# Patient Record
Sex: MALE | Race: WHITE | URBAN - METROPOLITAN AREA
[De-identification: names, ages, dates, MRNs, and addresses within clinical notes are randomized per-mention and may not be internally consistent; named-entity substitution may affect disease eponyms.]

---

## 2023-07-12 ENCOUNTER — OFFICE VISIT (OUTPATIENT)
Dept: URBAN - METROPOLITAN AREA CLINIC 10 | Facility: CLINIC | Age: 43
End: 2023-07-12
Payer: COMMERCIAL

## 2023-07-12 DIAGNOSIS — H18.601 KERATOCONUS, UNSPECIFIED, RIGHT EYE: Primary | ICD-10-CM

## 2023-07-12 DIAGNOSIS — H18.602 KERATOCONUS, UNSPECIFIED, LEFT EYE: ICD-10-CM

## 2023-07-12 PROCEDURE — 76514 ECHO EXAM OF EYE THICKNESS: CPT | Performed by: OPHTHALMOLOGY

## 2023-07-12 PROCEDURE — 92025 CPTRIZED CORNEAL TOPOGRAPHY: CPT | Performed by: OPHTHALMOLOGY

## 2023-07-12 PROCEDURE — 92004 COMPRE OPH EXAM NEW PT 1/>: CPT | Performed by: OPHTHALMOLOGY

## 2023-07-12 ASSESSMENT — INTRAOCULAR PRESSURE
OD: 17
OS: 17

## 2023-07-12 ASSESSMENT — VISUAL ACUITY
OD: 20/50
OS: 20/250

## 2023-07-12 ASSESSMENT — KERATOMETRY: OD: 45.13

## 2023-07-12 NOTE — IMPRESSION/PLAN
Impression: Keratoconus, unspecified, right eye: H18.601. H/o CXL OU x 2 by [employer] in [place], suspect epi-on, will get notes Plan: Explained to the patient the progressive nature of the disease and can significantly reduce a patient's visual acuity and visual quality. First line treatments include hard contact lenses, scleral contact lenses and intracorneal ring segments. These treatments address signs and symptoms but do not halt progression of the disease. If the disease progresses, it can lead to corneal blindness and may require corneal transplantation for visual recovery which is invasive, has a prolonged recovery period, and carries a lifelong risk of tissue rejection, graft failure, post-surgical medications and other complications. Corneal crosslinking (CXL) is the only FDA-approved treatment modality that has been shown to slow or halt the progression of keratoconus. This may prevent the need for future corneal transplantation. Early treatment of keratoconus with CXL is medically necessary and indicated. Understands that their insurance does not cover the procedure, refer to pt assistance program
*** See [facility] for specialty CTLs, medically necessary for functional vision.

## 2023-07-12 NOTE — IMPRESSION/PLAN
Impression: Keratoconus, unspecified, left eye: H18.602. Plan: H/o CXL OU x 2 by [employer] in [place], suspect epi off, will get notes. Also s/p INTAC segment OS. Explained to pt that will send to [facility] for specialty CTL fitting OS, if unable to get good fit or vision is unacceptable, may send back to cornea clinic for PKP OS

## 2024-01-08 ENCOUNTER — OFFICE VISIT (OUTPATIENT)
Dept: URBAN - METROPOLITAN AREA CLINIC 44 | Facility: CLINIC | Age: 44
End: 2024-01-08
Payer: COMMERCIAL

## 2024-01-08 DIAGNOSIS — H18.602 KERATOCONUS, UNSPECIFIED, LEFT EYE: Primary | ICD-10-CM

## 2024-01-08 DIAGNOSIS — H18.601 KERATOCONUS, UNSPECIFIED, RIGHT EYE: ICD-10-CM

## 2024-01-08 PROCEDURE — 76514 ECHO EXAM OF EYE THICKNESS: CPT | Performed by: OPHTHALMOLOGY

## 2024-01-08 PROCEDURE — 92025 CPTRIZED CORNEAL TOPOGRAPHY: CPT | Performed by: OPHTHALMOLOGY

## 2024-01-08 PROCEDURE — 92012 INTRM OPH EXAM EST PATIENT: CPT | Performed by: OPHTHALMOLOGY

## 2024-01-08 RX ORDER — PREDNISOLONE ACETATE 10 MG/ML
1 % SUSPENSION/ DROPS OPHTHALMIC
Qty: 5 | Refills: 3 | Status: INACTIVE
Start: 2024-01-08 | End: 2024-02-14

## 2024-01-08 RX ORDER — OFLOXACIN 3 MG/ML
0.3 % SOLUTION/ DROPS OPHTHALMIC
Qty: 5 | Refills: 1 | Status: ACTIVE
Start: 2024-01-08

## 2024-01-08 ASSESSMENT — INTRAOCULAR PRESSURE
OS: 22
OD: 22

## 2024-01-08 ASSESSMENT — VISUAL ACUITY
OS: 20/150
OD: 20/70

## 2024-01-08 ASSESSMENT — KERATOMETRY: OD: 48.13

## 2024-01-23 ENCOUNTER — ADULT PHYSICAL (OUTPATIENT)
Dept: URBAN - METROPOLITAN AREA CLINIC 44 | Facility: CLINIC | Age: 44
End: 2024-01-23
Payer: COMMERCIAL

## 2024-01-23 DIAGNOSIS — Z01.818 ENCOUNTER FOR OTHER PREPROCEDURAL EXAMINATION: Primary | ICD-10-CM

## 2024-01-23 PROCEDURE — 99203 OFFICE O/P NEW LOW 30 MIN: CPT | Performed by: PHYSICIAN ASSISTANT

## 2024-02-06 ENCOUNTER — SURGERY (OUTPATIENT)
Dept: URBAN - METROPOLITAN AREA SURGERY 5 | Facility: SURGERY | Age: 44
End: 2024-02-06
Payer: COMMERCIAL

## 2024-02-06 PROCEDURE — 65730 CORNEAL TRANSPLANT: CPT | Performed by: OPHTHALMOLOGY

## 2024-02-07 ENCOUNTER — POST-OPERATIVE VISIT (OUTPATIENT)
Dept: URBAN - METROPOLITAN AREA CLINIC 44 | Facility: CLINIC | Age: 44
End: 2024-02-07
Payer: COMMERCIAL

## 2024-02-07 DIAGNOSIS — Z48.810 ENCOUNTER FOR SURGICAL AFTERCARE FOLLOWING SURGERY ON A SENSE ORGAN: Primary | ICD-10-CM

## 2024-02-07 PROCEDURE — 99024 POSTOP FOLLOW-UP VISIT: CPT | Performed by: OPTOMETRIST

## 2024-02-07 ASSESSMENT — INTRAOCULAR PRESSURE: OS: 24

## 2024-02-13 ENCOUNTER — POST-OPERATIVE VISIT (OUTPATIENT)
Dept: URBAN - METROPOLITAN AREA CLINIC 44 | Facility: CLINIC | Age: 44
End: 2024-02-13
Payer: COMMERCIAL

## 2024-02-13 DIAGNOSIS — Z94.7 CORNEAL TRANSPLANT STATUS: Primary | ICD-10-CM

## 2024-02-13 PROCEDURE — 99024 POSTOP FOLLOW-UP VISIT: CPT | Performed by: OPHTHALMOLOGY

## 2024-02-13 ASSESSMENT — INTRAOCULAR PRESSURE
OD: 22
OS: 18

## 2024-02-27 ENCOUNTER — POST-OPERATIVE VISIT (OUTPATIENT)
Dept: URBAN - METROPOLITAN AREA CLINIC 44 | Facility: CLINIC | Age: 44
End: 2024-02-27
Payer: COMMERCIAL

## 2024-02-27 DIAGNOSIS — Z48.810 ENCOUNTER FOR SURGICAL AFTERCARE FOLLOWING SURGERY ON A SENSE ORGAN: ICD-10-CM

## 2024-02-27 DIAGNOSIS — Z94.7 CORNEAL TRANSPLANT STATUS: Primary | ICD-10-CM

## 2024-02-27 PROCEDURE — 99024 POSTOP FOLLOW-UP VISIT: CPT | Performed by: OPHTHALMOLOGY

## 2024-02-27 RX ORDER — DIFLUPREDNATE OPHTHALMIC 0.5 MG/ML
0.05 % EMULSION OPHTHALMIC
Qty: 5 | Refills: 6 | Status: ACTIVE
Start: 2024-02-27

## 2024-02-27 ASSESSMENT — INTRAOCULAR PRESSURE
OD: 19
OS: 26

## 2024-03-04 ENCOUNTER — OFFICE VISIT (OUTPATIENT)
Dept: URBAN - METROPOLITAN AREA CLINIC 44 | Facility: CLINIC | Age: 44
End: 2024-03-04
Payer: COMMERCIAL

## 2024-03-04 DIAGNOSIS — Z94.7 CORNEAL TRANSPLANT STATUS: Primary | ICD-10-CM

## 2024-03-04 DIAGNOSIS — H18.601 KERATOCONUS, UNSPECIFIED, RIGHT EYE: ICD-10-CM

## 2024-03-04 PROCEDURE — 99024 POSTOP FOLLOW-UP VISIT: CPT | Performed by: OPHTHALMOLOGY

## 2024-03-04 RX ORDER — PREDNISOLONE ACETATE 10 MG/ML
1 % SUSPENSION/ DROPS OPHTHALMIC
Qty: 10 | Refills: 3 | Status: ACTIVE
Start: 2024-03-04

## 2024-03-04 RX ORDER — DIFLUPREDNATE 0.5 MG/ML
0.05 % EMULSION OPHTHALMIC
Qty: 10 | Refills: 5 | Status: ACTIVE
Start: 2024-03-04

## 2024-03-04 ASSESSMENT — INTRAOCULAR PRESSURE
OS: 20
OD: 26
OD: 23

## 2024-03-18 ENCOUNTER — POST-OPERATIVE VISIT (OUTPATIENT)
Dept: URBAN - METROPOLITAN AREA CLINIC 44 | Facility: CLINIC | Age: 44
End: 2024-03-18
Payer: COMMERCIAL

## 2024-03-18 DIAGNOSIS — Z94.7 CORNEAL TRANSPLANT STATUS: Primary | ICD-10-CM

## 2024-03-18 PROCEDURE — 99024 POSTOP FOLLOW-UP VISIT: CPT | Performed by: OPHTHALMOLOGY

## 2024-03-18 ASSESSMENT — INTRAOCULAR PRESSURE
OS: 16
OD: 16

## 2024-04-29 ENCOUNTER — OFFICE VISIT (OUTPATIENT)
Dept: URBAN - METROPOLITAN AREA CLINIC 44 | Facility: CLINIC | Age: 44
End: 2024-04-29
Payer: COMMERCIAL

## 2024-04-29 DIAGNOSIS — H18.601 KERATOCONUS, UNSPECIFIED, RIGHT EYE: ICD-10-CM

## 2024-04-29 DIAGNOSIS — Z94.7 CORNEAL TRANSPLANT STATUS: Primary | ICD-10-CM

## 2024-04-29 PROCEDURE — 99024 POSTOP FOLLOW-UP VISIT: CPT | Performed by: OPHTHALMOLOGY

## 2024-04-29 RX ORDER — DIFLUPREDNATE OPHTHALMIC 0.5 MG/ML
0.05 % EMULSION OPHTHALMIC
Qty: 5 | Refills: 11 | Status: ACTIVE
Start: 2024-04-29

## 2024-04-29 ASSESSMENT — INTRAOCULAR PRESSURE
OS: 14
OD: 13

## 2024-06-10 ENCOUNTER — OFFICE VISIT (OUTPATIENT)
Dept: URBAN - METROPOLITAN AREA CLINIC 44 | Facility: CLINIC | Age: 44
End: 2024-06-10
Payer: COMMERCIAL

## 2024-06-10 DIAGNOSIS — Z94.7 CORNEAL TRANSPLANT STATUS: Primary | ICD-10-CM

## 2024-06-10 DIAGNOSIS — H18.601 KERATOCONUS, UNSPECIFIED, RIGHT EYE: ICD-10-CM

## 2024-06-10 PROCEDURE — 92012 INTRM OPH EXAM EST PATIENT: CPT | Performed by: OPHTHALMOLOGY

## 2024-06-10 ASSESSMENT — INTRAOCULAR PRESSURE
OS: 14
OD: 10

## 2024-09-16 ENCOUNTER — OFFICE VISIT (OUTPATIENT)
Dept: URBAN - METROPOLITAN AREA CLINIC 44 | Facility: CLINIC | Age: 44
End: 2024-09-16
Payer: COMMERCIAL

## 2024-09-16 DIAGNOSIS — H18.601 KERATOCONUS, UNSPECIFIED, RIGHT EYE: ICD-10-CM

## 2024-09-16 DIAGNOSIS — Z94.7 CORNEAL TRANSPLANT STATUS: Primary | ICD-10-CM

## 2024-09-16 PROCEDURE — 99213 OFFICE O/P EST LOW 20 MIN: CPT | Performed by: OPHTHALMOLOGY

## 2024-09-16 PROCEDURE — 92025 CPTRIZED CORNEAL TOPOGRAPHY: CPT | Performed by: OPHTHALMOLOGY

## 2024-09-16 RX ORDER — OFLOXACIN 3 MG/ML
0.3 % SOLUTION/ DROPS OPHTHALMIC
Qty: 5 | Refills: 1 | Status: ACTIVE
Start: 2024-09-16

## 2024-09-16 ASSESSMENT — KERATOMETRY: OD: 47.13

## 2024-09-16 ASSESSMENT — VISUAL ACUITY
OS: 20/100
OD: 20/70

## 2024-09-16 ASSESSMENT — INTRAOCULAR PRESSURE
OS: 20
OD: 15

## 2024-11-11 ENCOUNTER — OFFICE VISIT (OUTPATIENT)
Dept: URBAN - METROPOLITAN AREA CLINIC 44 | Facility: CLINIC | Age: 44
End: 2024-11-11
Payer: COMMERCIAL

## 2024-11-11 DIAGNOSIS — H18.601 KERATOCONUS, UNSPECIFIED, RIGHT EYE: ICD-10-CM

## 2024-11-11 DIAGNOSIS — Z94.7 CORNEAL TRANSPLANT STATUS: Primary | ICD-10-CM

## 2024-11-11 PROCEDURE — 92025 CPTRIZED CORNEAL TOPOGRAPHY: CPT | Performed by: OPHTHALMOLOGY

## 2024-11-11 PROCEDURE — 92012 INTRM OPH EXAM EST PATIENT: CPT | Performed by: OPHTHALMOLOGY

## 2024-11-11 ASSESSMENT — INTRAOCULAR PRESSURE
OS: 18
OD: 16

## 2024-11-11 ASSESSMENT — VISUAL ACUITY
OD: 20/60
OS: 20/125

## 2024-12-09 ENCOUNTER — OFFICE VISIT (OUTPATIENT)
Dept: URBAN - METROPOLITAN AREA CLINIC 44 | Facility: CLINIC | Age: 44
End: 2024-12-09
Payer: COMMERCIAL

## 2024-12-09 DIAGNOSIS — H18.601 KERATOCONUS, UNSPECIFIED, RIGHT EYE: ICD-10-CM

## 2024-12-09 DIAGNOSIS — Z94.7 CORNEAL TRANSPLANT STATUS: Primary | ICD-10-CM

## 2024-12-09 PROCEDURE — 92025 CPTRIZED CORNEAL TOPOGRAPHY: CPT | Performed by: OPHTHALMOLOGY

## 2024-12-09 PROCEDURE — 92012 INTRM OPH EXAM EST PATIENT: CPT | Performed by: OPHTHALMOLOGY

## 2024-12-09 ASSESSMENT — INTRAOCULAR PRESSURE
OS: 17
OD: 23

## 2024-12-09 ASSESSMENT — VISUAL ACUITY
OD: 20/50
OS: 20/50

## 2025-01-06 ENCOUNTER — OFFICE VISIT (OUTPATIENT)
Dept: URBAN - METROPOLITAN AREA CLINIC 44 | Facility: CLINIC | Age: 45
End: 2025-01-06
Payer: COMMERCIAL

## 2025-01-06 DIAGNOSIS — H18.601 KERATOCONUS, UNSPECIFIED, RIGHT EYE: ICD-10-CM

## 2025-01-06 DIAGNOSIS — Z94.7 CORNEAL TRANSPLANT STATUS: Primary | ICD-10-CM

## 2025-01-06 PROCEDURE — 92012 INTRM OPH EXAM EST PATIENT: CPT | Performed by: OPHTHALMOLOGY

## 2025-01-06 PROCEDURE — 92025 CPTRIZED CORNEAL TOPOGRAPHY: CPT | Performed by: OPHTHALMOLOGY

## 2025-01-06 ASSESSMENT — VISUAL ACUITY
OD: 20/60
OS: 20/70

## 2025-01-06 ASSESSMENT — INTRAOCULAR PRESSURE
OD: 18
OS: 15

## 2025-01-06 ASSESSMENT — KERATOMETRY: OD: 49.63

## 2025-02-17 ENCOUNTER — OFFICE VISIT (OUTPATIENT)
Dept: URBAN - METROPOLITAN AREA CLINIC 44 | Facility: CLINIC | Age: 45
End: 2025-02-17
Payer: COMMERCIAL

## 2025-02-17 DIAGNOSIS — H18.601 KERATOCONUS, UNSPECIFIED, RIGHT EYE: ICD-10-CM

## 2025-02-17 DIAGNOSIS — Z94.7 CORNEAL TRANSPLANT STATUS: Primary | ICD-10-CM

## 2025-02-17 PROCEDURE — 92025 CPTRIZED CORNEAL TOPOGRAPHY: CPT | Performed by: OPHTHALMOLOGY

## 2025-02-17 PROCEDURE — 92012 INTRM OPH EXAM EST PATIENT: CPT | Performed by: OPHTHALMOLOGY

## 2025-02-17 ASSESSMENT — INTRAOCULAR PRESSURE
OS: 18
OD: 15

## 2025-02-17 ASSESSMENT — VISUAL ACUITY: OS: 20/40

## 2025-03-19 ENCOUNTER — OFFICE VISIT (OUTPATIENT)
Dept: URBAN - METROPOLITAN AREA CLINIC 10 | Facility: CLINIC | Age: 45
End: 2025-03-19
Payer: COMMERCIAL

## 2025-03-19 DIAGNOSIS — H18.601 KERATOCONUS, UNSPECIFIED, RIGHT EYE: ICD-10-CM

## 2025-03-19 DIAGNOSIS — Z94.7 CORNEAL TRANSPLANT STATUS: Primary | ICD-10-CM

## 2025-03-19 PROCEDURE — 92025 CPTRIZED CORNEAL TOPOGRAPHY: CPT | Performed by: OPHTHALMOLOGY

## 2025-03-19 PROCEDURE — 92012 INTRM OPH EXAM EST PATIENT: CPT | Performed by: OPHTHALMOLOGY

## 2025-03-19 RX ORDER — OFLOXACIN 3 MG/ML
0.3 % SOLUTION/ DROPS OPHTHALMIC
Qty: 5 | Refills: 1 | Status: ACTIVE
Start: 2025-03-19

## 2025-03-19 RX ORDER — DIFLUPREDNATE OPHTHALMIC 0.5 MG/ML
0.05 % EMULSION OPHTHALMIC
Qty: 5 | Refills: 11 | Status: ACTIVE
Start: 2025-03-19

## 2025-03-19 RX ORDER — OFLOXACIN 3 MG/ML
0.3 % SOLUTION/ DROPS OPHTHALMIC
Qty: 5 | Refills: 1 | Status: INACTIVE
Start: 2025-03-19 | End: 2025-03-19

## 2025-03-19 ASSESSMENT — VISUAL ACUITY: OS: 20/80

## 2025-03-19 ASSESSMENT — INTRAOCULAR PRESSURE
OS: 14
OD: 14

## 2025-04-15 ENCOUNTER — OFFICE VISIT (OUTPATIENT)
Dept: URBAN - METROPOLITAN AREA CLINIC 44 | Facility: CLINIC | Age: 45
End: 2025-04-15
Payer: COMMERCIAL

## 2025-04-15 DIAGNOSIS — H18.601 KERATOCONUS, UNSPECIFIED, RIGHT EYE: ICD-10-CM

## 2025-04-15 DIAGNOSIS — Z94.7 CORNEAL TRANSPLANT STATUS: Primary | ICD-10-CM

## 2025-04-15 PROCEDURE — 92012 INTRM OPH EXAM EST PATIENT: CPT | Performed by: OPHTHALMOLOGY

## 2025-04-15 PROCEDURE — 92025 CPTRIZED CORNEAL TOPOGRAPHY: CPT | Performed by: OPHTHALMOLOGY

## 2025-04-15 ASSESSMENT — VISUAL ACUITY
OD: 20/40
OS: 20/40

## 2025-04-15 ASSESSMENT — INTRAOCULAR PRESSURE
OD: 17
OS: 16

## 2025-05-27 ENCOUNTER — OFFICE VISIT (OUTPATIENT)
Dept: URBAN - METROPOLITAN AREA CLINIC 44 | Facility: CLINIC | Age: 45
End: 2025-05-27
Payer: COMMERCIAL

## 2025-05-27 DIAGNOSIS — H18.601 KERATOCONUS, UNSPECIFIED, RIGHT EYE: ICD-10-CM

## 2025-05-27 DIAGNOSIS — Z94.7 CORNEAL TRANSPLANT STATUS: Primary | ICD-10-CM

## 2025-05-27 PROCEDURE — 92012 INTRM OPH EXAM EST PATIENT: CPT | Performed by: OPHTHALMOLOGY

## 2025-05-27 ASSESSMENT — INTRAOCULAR PRESSURE
OS: 18
OD: 17

## 2025-05-27 ASSESSMENT — VISUAL ACUITY
OD: 20/70
OS: 20/40

## 2025-06-24 ENCOUNTER — OFFICE VISIT (OUTPATIENT)
Dept: URBAN - METROPOLITAN AREA CLINIC 72 | Facility: CLINIC | Age: 45
End: 2025-06-24
Payer: COMMERCIAL

## 2025-06-24 DIAGNOSIS — H52.223 REGULAR ASTIGMATISM, BILATERAL: ICD-10-CM

## 2025-06-24 DIAGNOSIS — H18.601 KERATOCONUS, UNSPECIFIED, RIGHT EYE: Primary | ICD-10-CM

## 2025-06-24 PROCEDURE — 99203 OFFICE O/P NEW LOW 30 MIN: CPT | Performed by: OPTOMETRIST

## 2025-06-24 ASSESSMENT — INTRAOCULAR PRESSURE
OD: 10
OS: 12

## 2025-06-24 ASSESSMENT — VISUAL ACUITY
OD: 20/50
OS: 20/40